# Patient Record
Sex: FEMALE | Race: OTHER | NOT HISPANIC OR LATINO | URBAN - METROPOLITAN AREA
[De-identification: names, ages, dates, MRNs, and addresses within clinical notes are randomized per-mention and may not be internally consistent; named-entity substitution may affect disease eponyms.]

---
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
## 2023-11-07 NOTE — ED PEDIATRIC NURSE NOTE - NS ED NOTE  FEEL SAFE YN PEDS
Patient confirming that antibotic will be called in to [facility] to take prior to her procedure( s ).   no